# Patient Record
Sex: FEMALE | Race: WHITE | Employment: PART TIME | URBAN - METROPOLITAN AREA
[De-identification: names, ages, dates, MRNs, and addresses within clinical notes are randomized per-mention and may not be internally consistent; named-entity substitution may affect disease eponyms.]

---

## 2017-01-25 ENCOUNTER — TELEPHONE (OUTPATIENT)
Dept: FAMILY MEDICINE CLINIC | Age: 52
End: 2017-01-25

## 2017-01-25 ENCOUNTER — OFFICE VISIT (OUTPATIENT)
Dept: FAMILY MEDICINE CLINIC | Age: 52
End: 2017-01-25

## 2017-01-25 VITALS
BODY MASS INDEX: 22.1 KG/M2 | DIASTOLIC BLOOD PRESSURE: 76 MMHG | HEIGHT: 66 IN | WEIGHT: 137.5 LBS | SYSTOLIC BLOOD PRESSURE: 110 MMHG

## 2017-01-25 DIAGNOSIS — M54.5 ACUTE RIGHT-SIDED LOW BACK PAIN, WITH SCIATICA PRESENCE UNSPECIFIED: Primary | ICD-10-CM

## 2017-01-25 LAB
BILIRUBIN, POC: ABNORMAL
BLOOD URINE, POC: ABNORMAL
CLARITY, POC: ABNORMAL
COLOR, POC: ABNORMAL
GLUCOSE URINE, POC: ABNORMAL
KETONES, POC: ABNORMAL
LEUKOCYTE EST, POC: ABNORMAL
NITRITE, POC: ABNORMAL
PH, POC: 6.5
PROTEIN, POC: ABNORMAL
SPECIFIC GRAVITY, POC: 1.01
UROBILINOGEN, POC: 0.2

## 2017-01-25 PROCEDURE — 99213 OFFICE O/P EST LOW 20 MIN: CPT | Performed by: FAMILY MEDICINE

## 2017-01-25 PROCEDURE — 81002 URINALYSIS NONAUTO W/O SCOPE: CPT | Performed by: FAMILY MEDICINE

## 2017-01-25 RX ORDER — CYCLOBENZAPRINE HCL 10 MG
10 TABLET ORAL 3 TIMES DAILY PRN
Qty: 30 TABLET | Refills: 0 | Status: SHIPPED | OUTPATIENT
Start: 2017-01-25 | End: 2017-02-04

## 2017-01-25 ASSESSMENT — ENCOUNTER SYMPTOMS
BACK PAIN: 1
BOWEL INCONTINENCE: 0
ABDOMINAL PAIN: 0

## 2017-01-28 ENCOUNTER — TELEPHONE (OUTPATIENT)
Dept: FAMILY MEDICINE CLINIC | Age: 52
End: 2017-01-28

## 2017-08-02 ENCOUNTER — TELEPHONE (OUTPATIENT)
Dept: FAMILY MEDICINE CLINIC | Age: 52
End: 2017-08-02

## 2017-08-02 ENCOUNTER — OFFICE VISIT (OUTPATIENT)
Dept: FAMILY MEDICINE CLINIC | Age: 52
End: 2017-08-02

## 2017-08-02 VITALS
TEMPERATURE: 97.5 F | SYSTOLIC BLOOD PRESSURE: 126 MMHG | WEIGHT: 134.4 LBS | HEIGHT: 66 IN | DIASTOLIC BLOOD PRESSURE: 70 MMHG | BODY MASS INDEX: 21.6 KG/M2

## 2017-08-02 DIAGNOSIS — R68.83 CHILLS WITHOUT FEVER: Primary | ICD-10-CM

## 2017-08-02 DIAGNOSIS — N39.0 URINARY TRACT INFECTION WITHOUT HEMATURIA, SITE UNSPECIFIED: ICD-10-CM

## 2017-08-02 LAB
A/G RATIO: 1.5 (ref 1.1–2.2)
ALBUMIN SERPL-MCNC: 4.3 G/DL (ref 3.4–5)
ALP BLD-CCNC: 65 U/L (ref 40–129)
ALT SERPL-CCNC: 32 U/L (ref 10–40)
ANION GAP SERPL CALCULATED.3IONS-SCNC: 14 MMOL/L (ref 3–16)
AST SERPL-CCNC: 42 U/L (ref 15–37)
BILIRUB SERPL-MCNC: 0.5 MG/DL (ref 0–1)
BILIRUBIN, POC: ABNORMAL
BLOOD URINE, POC: ABNORMAL
BUN BLDV-MCNC: 8 MG/DL (ref 7–20)
CALCIUM SERPL-MCNC: 9.2 MG/DL (ref 8.3–10.6)
CHLORIDE BLD-SCNC: 102 MMOL/L (ref 99–110)
CLARITY, POC: ABNORMAL
CO2: 24 MMOL/L (ref 21–32)
COLOR, POC: YELLOW
CREAT SERPL-MCNC: 0.8 MG/DL (ref 0.6–1.1)
GFR AFRICAN AMERICAN: >60
GFR NON-AFRICAN AMERICAN: >60
GLOBULIN: 2.8 G/DL
GLUCOSE BLD-MCNC: 90 MG/DL (ref 70–99)
GLUCOSE URINE, POC: ABNORMAL
HCT VFR BLD CALC: 37.2 % (ref 36–48)
HEMOGLOBIN: 12.6 G/DL (ref 12–16)
KETONES, POC: ABNORMAL
LEUKOCYTE EST, POC: ABNORMAL
MCH RBC QN AUTO: 33.8 PG (ref 26–34)
MCHC RBC AUTO-ENTMCNC: 33.8 G/DL (ref 31–36)
MCV RBC AUTO: 100 FL (ref 80–100)
NITRITE, POC: ABNORMAL
PDW BLD-RTO: 13.4 % (ref 12.4–15.4)
PH, POC: 6
PLATELET # BLD: 230 K/UL (ref 135–450)
PMV BLD AUTO: 9 FL (ref 5–10.5)
POTASSIUM SERPL-SCNC: 4.1 MMOL/L (ref 3.5–5.1)
PROTEIN, POC: ABNORMAL
RBC # BLD: 3.72 M/UL (ref 4–5.2)
SEDIMENTATION RATE, ERYTHROCYTE: 19 MM/HR (ref 0–30)
SODIUM BLD-SCNC: 140 MMOL/L (ref 136–145)
SPECIFIC GRAVITY, POC: 1.01
TOTAL PROTEIN: 7.1 G/DL (ref 6.4–8.2)
TSH SERPL DL<=0.05 MIU/L-ACNC: 1.89 UIU/ML (ref 0.27–4.2)
UROBILINOGEN, POC: 0.2
WBC # BLD: 5.7 K/UL (ref 4–11)

## 2017-08-02 PROCEDURE — 36415 COLL VENOUS BLD VENIPUNCTURE: CPT | Performed by: FAMILY MEDICINE

## 2017-08-02 PROCEDURE — 99214 OFFICE O/P EST MOD 30 MIN: CPT | Performed by: FAMILY MEDICINE

## 2017-08-02 PROCEDURE — 81002 URINALYSIS NONAUTO W/O SCOPE: CPT | Performed by: FAMILY MEDICINE

## 2017-08-02 RX ORDER — NITROFURANTOIN 25; 75 MG/1; MG/1
100 CAPSULE ORAL 2 TIMES DAILY
Qty: 10 CAPSULE | Refills: 0 | Status: SHIPPED | OUTPATIENT
Start: 2017-08-02 | End: 2017-08-04 | Stop reason: SDUPTHER

## 2017-08-02 ASSESSMENT — ENCOUNTER SYMPTOMS
GASTROINTESTINAL NEGATIVE: 1
RESPIRATORY NEGATIVE: 1

## 2017-08-04 DIAGNOSIS — N39.0 URINARY TRACT INFECTION WITHOUT HEMATURIA, SITE UNSPECIFIED: ICD-10-CM

## 2017-08-04 LAB
ORGANISM: ABNORMAL
URINE CULTURE, ROUTINE: ABNORMAL

## 2017-08-04 RX ORDER — NITROFURANTOIN 25; 75 MG/1; MG/1
100 CAPSULE ORAL 2 TIMES DAILY
Qty: 10 CAPSULE | Refills: 0 | Status: SHIPPED | OUTPATIENT
Start: 2017-08-04 | End: 2017-08-09

## 2017-08-11 ENCOUNTER — OFFICE VISIT (OUTPATIENT)
Dept: FAMILY MEDICINE CLINIC | Age: 52
End: 2017-08-11

## 2017-08-11 VITALS
SYSTOLIC BLOOD PRESSURE: 136 MMHG | WEIGHT: 134.4 LBS | DIASTOLIC BLOOD PRESSURE: 76 MMHG | HEIGHT: 66 IN | BODY MASS INDEX: 21.6 KG/M2

## 2017-08-11 DIAGNOSIS — H57.12 EYE PAIN, LEFT: Primary | ICD-10-CM

## 2017-08-11 PROCEDURE — 99213 OFFICE O/P EST LOW 20 MIN: CPT | Performed by: FAMILY MEDICINE

## 2017-08-11 RX ORDER — AMPICILLIN 500 MG/1
500 CAPSULE ORAL 4 TIMES DAILY
Qty: 12 CAPSULE | Refills: 0 | Status: SHIPPED | OUTPATIENT
Start: 2017-08-11 | End: 2017-08-14

## 2017-08-11 ASSESSMENT — ENCOUNTER SYMPTOMS
EYE REDNESS: 0
PHOTOPHOBIA: 0
NAUSEA: 0
DOUBLE VISION: 0
VOMITING: 0
BLURRED VISION: 0
EYE DISCHARGE: 0
EYE PAIN: 1
FOREIGN BODY SENSATION: 0

## 2018-06-05 ENCOUNTER — OFFICE VISIT (OUTPATIENT)
Dept: FAMILY MEDICINE CLINIC | Age: 53
End: 2018-06-05

## 2018-06-05 VITALS
SYSTOLIC BLOOD PRESSURE: 122 MMHG | TEMPERATURE: 98.1 F | WEIGHT: 135 LBS | BODY MASS INDEX: 21.69 KG/M2 | HEIGHT: 66 IN | DIASTOLIC BLOOD PRESSURE: 76 MMHG

## 2018-06-05 DIAGNOSIS — B96.89 ACUTE BACTERIAL SINUSITIS: Primary | ICD-10-CM

## 2018-06-05 DIAGNOSIS — J01.90 ACUTE BACTERIAL SINUSITIS: Primary | ICD-10-CM

## 2018-06-05 PROCEDURE — 99213 OFFICE O/P EST LOW 20 MIN: CPT | Performed by: FAMILY MEDICINE

## 2018-06-05 RX ORDER — AZITHROMYCIN 250 MG/1
TABLET, FILM COATED ORAL
Qty: 1 PACKET | Refills: 0 | Status: SHIPPED | OUTPATIENT
Start: 2018-06-05 | End: 2018-06-15

## 2018-06-05 ASSESSMENT — PATIENT HEALTH QUESTIONNAIRE - PHQ9
1. LITTLE INTEREST OR PLEASURE IN DOING THINGS: 0
SUM OF ALL RESPONSES TO PHQ QUESTIONS 1-9: 0
SUM OF ALL RESPONSES TO PHQ9 QUESTIONS 1 & 2: 0
2. FEELING DOWN, DEPRESSED OR HOPELESS: 0

## 2018-06-05 ASSESSMENT — ENCOUNTER SYMPTOMS
COUGH: 1
SINUS PRESSURE: 1

## 2020-02-27 ENCOUNTER — TELEPHONE (OUTPATIENT)
Dept: FAMILY MEDICINE CLINIC | Age: 55
End: 2020-02-27

## 2020-02-27 RX ORDER — FAMCICLOVIR 500 MG/1
500 TABLET, FILM COATED ORAL 3 TIMES DAILY
Qty: 21 TABLET | Refills: 0 | Status: SHIPPED | OUTPATIENT
Start: 2020-02-27 | End: 2020-03-05

## 2020-10-15 ENCOUNTER — OFFICE VISIT (OUTPATIENT)
Dept: PRIMARY CARE CLINIC | Age: 55
End: 2020-10-15

## 2020-10-15 PROCEDURE — 99211 OFF/OP EST MAY X REQ PHY/QHP: CPT | Performed by: NURSE PRACTITIONER

## 2020-10-15 NOTE — PROGRESS NOTES
Marlena Meehan received a viral test for COVID-19. They were educated on isolation and quarantine as appropriate. For any symptoms, they were directed to seek care from their PCP, given contact information to establish with a doctor, directed to an urgent care or the emergency room.

## 2020-10-16 LAB — SARS-COV-2, NAA: NOT DETECTED

## 2020-10-19 NOTE — RESULT ENCOUNTER NOTE

## 2021-03-15 PROCEDURE — 91300 COVID-19, PFIZER VACCINE 30MCG/0.3ML DOSE: CPT | Performed by: NURSE PRACTITIONER

## 2021-03-15 PROCEDURE — 0001A COVID-19, PFIZER VACCINE 30MCG/0.3ML DOSE: CPT | Performed by: NURSE PRACTITIONER

## 2021-03-17 ENCOUNTER — NURSE ONLY (OUTPATIENT)
Dept: PRIMARY CARE CLINIC | Age: 56
End: 2021-03-17
Payer: OTHER GOVERNMENT

## 2021-03-17 DIAGNOSIS — Z23 HIGH PRIORITY FOR COVID-19 VIRUS VACCINATION: Primary | ICD-10-CM

## 2021-04-05 PROCEDURE — 0002A COVID-19, PFIZER VACCINE 30MCG/0.3ML DOSE: CPT | Performed by: NURSE PRACTITIONER

## 2021-04-05 PROCEDURE — 91300 COVID-19, PFIZER VACCINE 30MCG/0.3ML DOSE: CPT | Performed by: NURSE PRACTITIONER

## 2021-04-14 ENCOUNTER — NURSE ONLY (OUTPATIENT)
Dept: PRIMARY CARE CLINIC | Age: 56
End: 2021-04-14
Payer: OTHER GOVERNMENT

## 2021-04-14 DIAGNOSIS — Z23 HIGH PRIORITY FOR COVID-19 VIRUS VACCINATION: Primary | ICD-10-CM
